# Patient Record
Sex: FEMALE | Race: WHITE | NOT HISPANIC OR LATINO | Employment: FULL TIME | URBAN - METROPOLITAN AREA
[De-identification: names, ages, dates, MRNs, and addresses within clinical notes are randomized per-mention and may not be internally consistent; named-entity substitution may affect disease eponyms.]

---

## 2017-11-28 ENCOUNTER — NON-PROVIDER VISIT (OUTPATIENT)
Dept: URGENT CARE | Facility: PHYSICIAN GROUP | Age: 42
End: 2017-11-28

## 2017-11-28 DIAGNOSIS — Z02.1 PRE-EMPLOYMENT DRUG SCREENING: ICD-10-CM

## 2017-11-28 LAB
AMP AMPHETAMINE: NORMAL
COC COCAINE: NORMAL
INT CON NEG: NEGATIVE
INT CON POS: POSITIVE
MET METHAMPHETAMINES: NORMAL
OPI OPIATES: NORMAL
PCP PHENCYCLIDINE: NORMAL
POC DRUG COMMENT 753798-OCCUPATIONAL HEALTH: NORMAL
THC: NORMAL

## 2017-11-28 PROCEDURE — 80305 DRUG TEST PRSMV DIR OPT OBS: CPT | Performed by: FAMILY MEDICINE

## 2020-01-29 ENCOUNTER — HOSPITAL ENCOUNTER (EMERGENCY)
Facility: MEDICAL CENTER | Age: 45
End: 2020-01-29
Attending: EMERGENCY MEDICINE

## 2020-01-29 ENCOUNTER — APPOINTMENT (OUTPATIENT)
Dept: RADIOLOGY | Facility: MEDICAL CENTER | Age: 45
End: 2020-01-29
Attending: EMERGENCY MEDICINE

## 2020-01-29 VITALS
HEIGHT: 63 IN | SYSTOLIC BLOOD PRESSURE: 115 MMHG | HEART RATE: 79 BPM | DIASTOLIC BLOOD PRESSURE: 72 MMHG | BODY MASS INDEX: 26.41 KG/M2 | OXYGEN SATURATION: 98 % | WEIGHT: 149.03 LBS | TEMPERATURE: 98.4 F | RESPIRATION RATE: 18 BRPM

## 2020-01-29 DIAGNOSIS — H91.92 HEARING LOSS OF LEFT EAR, UNSPECIFIED HEARING LOSS TYPE: ICD-10-CM

## 2020-01-29 PROCEDURE — 70491 CT SOFT TISSUE NECK W/DYE: CPT

## 2020-01-29 PROCEDURE — 70450 CT HEAD/BRAIN W/O DYE: CPT

## 2020-01-29 PROCEDURE — 96374 THER/PROPH/DIAG INJ IV PUSH: CPT

## 2020-01-29 PROCEDURE — 96375 TX/PRO/DX INJ NEW DRUG ADDON: CPT

## 2020-01-29 PROCEDURE — 99284 EMERGENCY DEPT VISIT MOD MDM: CPT

## 2020-01-29 PROCEDURE — 700117 HCHG RX CONTRAST REV CODE 255: Performed by: EMERGENCY MEDICINE

## 2020-01-29 PROCEDURE — 700111 HCHG RX REV CODE 636 W/ 250 OVERRIDE (IP): Performed by: EMERGENCY MEDICINE

## 2020-01-29 RX ORDER — PROCHLORPERAZINE EDISYLATE 5 MG/ML
10 INJECTION INTRAMUSCULAR; INTRAVENOUS ONCE
Status: COMPLETED | OUTPATIENT
Start: 2020-01-29 | End: 2020-01-29

## 2020-01-29 RX ORDER — KETOROLAC TROMETHAMINE 30 MG/ML
15 INJECTION, SOLUTION INTRAMUSCULAR; INTRAVENOUS ONCE
Status: COMPLETED | OUTPATIENT
Start: 2020-01-29 | End: 2020-01-29

## 2020-01-29 RX ORDER — DIPHENHYDRAMINE HYDROCHLORIDE 50 MG/ML
25 INJECTION INTRAMUSCULAR; INTRAVENOUS ONCE
Status: COMPLETED | OUTPATIENT
Start: 2020-01-29 | End: 2020-01-29

## 2020-01-29 RX ORDER — METHYLPREDNISOLONE SODIUM SUCCINATE 125 MG/2ML
125 INJECTION, POWDER, LYOPHILIZED, FOR SOLUTION INTRAMUSCULAR; INTRAVENOUS ONCE
Status: COMPLETED | OUTPATIENT
Start: 2020-01-29 | End: 2020-01-29

## 2020-01-29 RX ORDER — PREDNISONE 20 MG/1
60 TABLET ORAL DAILY
Qty: 15 TAB | Refills: 0 | Status: SHIPPED | OUTPATIENT
Start: 2020-01-29 | End: 2020-02-03

## 2020-01-29 RX ADMIN — KETOROLAC TROMETHAMINE 15 MG: 30 INJECTION, SOLUTION INTRAMUSCULAR at 20:52

## 2020-01-29 RX ADMIN — IOHEXOL 80 ML: 350 INJECTION, SOLUTION INTRAVENOUS at 22:25

## 2020-01-29 RX ADMIN — PROCHLORPERAZINE EDISYLATE 10 MG: 5 INJECTION INTRAMUSCULAR; INTRAVENOUS at 20:53

## 2020-01-29 RX ADMIN — METHYLPREDNISOLONE SODIUM SUCCINATE 125 MG: 125 INJECTION, POWDER, FOR SOLUTION INTRAMUSCULAR; INTRAVENOUS at 20:54

## 2020-01-29 RX ADMIN — DIPHENHYDRAMINE HYDROCHLORIDE 25 MG: 50 INJECTION INTRAMUSCULAR; INTRAVENOUS at 20:52

## 2020-01-29 ASSESSMENT — LIFESTYLE VARIABLES: DO YOU DRINK ALCOHOL: NO

## 2020-01-30 NOTE — ED NOTES
Pt ambulated to room with assistance from RN. Agree with triage note. Assessment complete. Chart ready for ERP.

## 2020-01-30 NOTE — ED TRIAGE NOTES
Chief Complaint   Patient presents with   • Ringing in Ear   • Headache   Pt to triage in NAD.  Pt reports a buzzing/low hum in left ear and headache since last night.  Pt additionally reports some loss of peripheral vision on the left side and lump on left side of neck x 2 months.  Neuro intact.  Pt educated on triage process and instructed to notify triage RN of any change in status.

## 2020-01-30 NOTE — ED NOTES
Pt medicated for HA and pre-medicated for CT scan (see MAR). Call light within reach, friend bedside. Will continue to round.

## 2020-01-30 NOTE — ED NOTES
Pt returned from CT, denies signs of allergic reaction. Vitals repeated. Call light within reach.

## 2020-01-30 NOTE — ED NOTES
D/C instructions provided to patient at bedside, verbalizes understanding and states plans for follow-up with Otolaryngology as recommended. Scripts given and explained.   IV removed and dressed. All belongings accounted for, all questions answered at this time.   Pt ambulated to [facility], has called ride home.

## 2020-01-30 NOTE — DISCHARGE INSTRUCTIONS
You were seen in the ER for hearing loss and headache.  Your CT scan of the neck did show enlarged lymph nodes but there were no other abnormalities.  I have spoken with our on-call ear, nose, and throat doctor who would like to see you in her office next week.  She has asked that she call her office tomorrow to make an appointment and I have given her contact information on these discharge papers.  She has requested that I start you on a 5-day burst of steroids and I have given you a prescription for these.  Please take them as directed.  You should return immediately to the ER with any new or worsening symptoms.

## 2020-01-31 NOTE — ED PROVIDER NOTES
ED Provider Note    CHIEF COMPLAINT  Chief Complaint   Patient presents with   • Ringing in Ear   • Headache       HPI  Bridget Hernandez is a 44 y.o. female who presents with a chief complaint of hearing changes. Patient reports that she was largely in her baseline state of health until this morning when she awakened with a loud buzzing in her left ear. She notes that she is unable to hear anything in the ear except for the buzzing. She endorses an associated headache which is diffuse. She does have a history of migraines but this is not consistent with those. It is not the worst headache of her life and was present when she awakened. She has not tried any medication for her symptoms. She denies any photophobia, phonophobia, nausea, vomiting, numbness or weakness in the extremities. She adds that she noticed a non-tender swelling in her left neck two months ago that has been  persistent. It has not grown or changed since its initial presentation. She denies any sore throat, neck pain, rhinorrhea or nasal congestion, fevers, unintentional weight loss. She has no history of similar symptoms. No aspirin use, drug use, or alcohol use.    REVIEW OF SYSTEMS  See HPI for further details. Hearing loss in the left ear. Tinnitus. Painless left sided neck mass. All other systems are negative.     PAST MEDICAL HISTORY   has a past medical history of Backpain, Bile-induced gastritis, Bloody stools, Colonic ulcer (2009), Cramping pain in the abdomen, Crohn's disease ([facility]), Endometriosis (1/2009), Erosive gastritis (3/23/15), GERD (gastroesophageal reflux disease), Irritable bowel disease, Migraine, Mitral valvular regurgitation, Other and unspecified angina pectoris, and Supraventricular tachycardia ([facility]).    SOCIAL HISTORY  Social History     Tobacco Use   • Smoking status: Current Every Day Smoker     Packs/day: 0.00   • Smokeless tobacco: Never Used   Substance and Sexual Activity   • Alcohol use: No   • Drug use: No   • Sexual  "activity: Not on file       SURGICAL HISTORY   has a past surgical history that includes pelviscopy (12-21-07); keo by laparoscopy; laparoscopy; tonsillectomy; tubal ligation; appendectomy laparoscopic (6/12/08); mammoplasty reduction (1/9/2009); primary c section; pelviscopy (1/9/2009); laser ablation (1/9/2009); hysterectomy laparoscopy (2008); other; other (1998); colonoscopy (4/1/2009); cholecystectomy; hiatal hernia repair; and egd with asp/bx (3/2015).    CURRENT MEDICATIONS  Home Medications     Reviewed by Rishi Anand R.N. (Registered Nurse) on 01/29/20 at 2058  Med List Status: Partial   Medication Last Dose Status        Patient Charles Taking any Medications                       ALLERGIES  Allergies   Allergen Reactions   • Contrast Media With Iodine [Iodine] Hives and Itching     Patient states she gets \"itchy and hives\".   • Imitrex [Sumatriptan] Rash   • Other Misc Itching     Intravenous iodine contrast    Rxn 9 mos ago for CT Abd ([facility])   • Reglan [Metoclopramide Hcl]      Hot, sweaty (pt states)       PHYSICAL EXAM  VITAL SIGNS: /72   Pulse 79   Temp 36.9 °C (98.4 °F) (Temporal)   Resp 18   Ht 1.6 m (5' 3\")   Wt 67.6 kg (149 lb 0.5 oz)   LMP 12/18/2007   SpO2 98%   BMI 26.40 kg/m²    Pulse ox interpretation: I interpret this pulse ox as normal.  Constitutional: Alert in no apparent distress.  HENT: No signs of trauma, Bilateral external ears normal, Nose normal. Moist mucous membranes.  Eyes: Pupils are equal and reactive, Conjunctiva normal, Non-icteric. Bilateral TMs are pearly with good light reflex. No foreign bodies noted in left external auditory canal.   Neck: Normal range of motion, No tenderness, Supple, No stridor.   Lymphatic: Non-tender bilateral anterior cervical lymphadenopathy. Bilateral nodes are well-circumscribed, mobile, no overlying erythema, fluctuance.  Cardiovascular: Regular rate and rhythm, no murmurs.   Thorax & Lungs: Normal breath sounds, No " respiratory distress, No wheezing, No chest tenderness.   Abdomen: Bowel sounds normal, Soft, No tenderness, No masses, No pulsatile masses. No peritoneal signs.  Skin: Warm, Dry, No erythema, No rash.   Back: Normal alignment.  Extremities: Intact distal pulses, No edema, No tenderness, No cyanosis.  Musculoskeletal: Good range of motion in all major joints. No tenderness to palpation or major deformities noted.   Neurologic: Alert, Normal speech, normal vision, decreased hearing on the left, remainder of cranial nerve exam is normal. Normal strength and sensation. Normal gait and stance.  Psychiatric: Affect normal, Judgment normal, Mood normal.     DIAGNOSTIC STUDIES / PROCEDURES    RADIOLOGY  CT head  CT neck soft tissue      COURSE & MEDICAL DECISION MAKING  Pertinent Labs & Imaging studies reviewed. (See chart for details)  This is a 44 year old female with a h/o migraine headache here with left sided hearing loss, headache, and neck mass. Unclear if the two symptoms are connected. Concern for neoplasm, intracranial mass, sensorineural hearing loss, viral syndrome, infection, migraine with aura, less likely dural venous sinus thrombosis as she is not taking exogenous hormones, SAH, SDH, CVA. Arrives AF with normal VS. Appears well hydrated and non-toxic. PE is largely unremarkable. Normal neurologic exam with the exception of decreased hearing on the left. Otherwise normal cranial nerve exam. TMs are pearly bilaterally. No evidence for otitis media or externa. Neck is supple, no fever, normal mental status. Doubt CNS infection. She does have obvious bilateral cervical lymphadenopathy, left worse than right. The left lymph node is visible upon inspection. She denies any infectious symptoms such as pharyngitis, ear pain, nasal congestion/rhinorrhea which is concerning for potential neoplastic process. She has an allergy to iodine and has required premedication in the past; I feel that she will require a CT of  the neck with contrast for evaluation of her lymphadenopathy so she was given a dose of solumedrol and benadryl for predmedication. A CT of the head was also ordered to evaluate for mass or hemorrhage. She was given a dose of reglan as well.    CT of the neck revealed new lymphadenopathy characterized by an increased number of normal-sized nodes with some being enlarged. Reactive was favored over neoplastic but I am concerned because they have been present for two months and are not painful nor was there an infectious source prior to their presentation. CT head is thankfully normal.    I spoke with the on-call ENT, Dr. Diaz, who recommends she be seen in her clinic as an outpatient next week for a hearing test and FNA of the nodes. She has recommended that we initiate a course of prednisone. We did discuss concerns for starting the patient on steroids with a potential underlying neoplastic process but the benefit was felt to outweigh the risk due to concerns for permanent hearing loss. Labs were not felt to be beneficial.    Upon reevaluation, the patient is resting comfortably. Her headache has resolved but her hearing discrepancy remains. Very low suspicion for SAH given presentation. We discussed the findings and Dr. Diaz's recommendations. She has already received a dose of solumedrol IV for CT premedication so I will defer a dose of prednisone prior to d/c. She was given a prescription for prednisone and we talked about the importance of the medication. She was also given Dr. Diaz's contact information and I have relayed the importance of close follow up. She is to call the office in the morning to make an appointment. She has insurance and there should be no difficulties securing an appointment. She remains well appearing and is safe for discharge. Hospitalization is unlikely to be beneficial.    The patient will not drink alcohol nor drive with prescribed medications. The patient will return for  worsening symptoms and was discharged in good and stable condition. The patient verbalizes understanding and will comply.    FINAL IMPRESSION  1. Hearing loss  2. Lymphadenopathy    Electronically signed by: Raphael Slaughter M.D., 1/30/2020 4:38 PM